# Patient Record
Sex: FEMALE | Race: WHITE
[De-identification: names, ages, dates, MRNs, and addresses within clinical notes are randomized per-mention and may not be internally consistent; named-entity substitution may affect disease eponyms.]

---

## 2017-09-25 LAB
ERYTHROCYTE [DISTWIDTH] IN BLOOD BY AUTOMATED COUNT: 13.2 % (ref 11.5–14.5)
HCT VFR BLD CALC: 43.7 % (ref 36–48)
HGB BLD-MCNC: 14.7 G/DL (ref 12–16)
MCH RBC QN AUTO: 30.1 PG (ref 26–34)
MCHC RBC AUTO-ENTMCNC: 33.6 G/DL (ref 31–37)
MCV RBC: 89.4 FL (ref 80–100)
PLATELET # BLD: 189 10X3/UL (ref 130–400)
PMV BLD AUTO: 10 FL (ref 7.4–10.4)
RBC # BLD AUTO: 4.89 10X6/UL (ref 4–5.4)
WBC # BLD AUTO: 6.7 10X3/UL (ref 4.8–10.8)

## 2017-09-27 ENCOUNTER — HOSPITAL ENCOUNTER (OUTPATIENT)
Dept: HOSPITAL 84 - D.OPS | Age: 52
Discharge: HOME | End: 2017-09-27
Attending: SURGERY
Payer: COMMERCIAL

## 2017-09-27 VITALS — BODY MASS INDEX: 33.66 KG/M2 | WEIGHT: 190 LBS | HEIGHT: 63 IN

## 2017-09-27 VITALS — DIASTOLIC BLOOD PRESSURE: 64 MMHG | SYSTOLIC BLOOD PRESSURE: 128 MMHG

## 2017-09-27 DIAGNOSIS — K63.5: Primary | ICD-10-CM

## 2017-09-27 DIAGNOSIS — E03.9: ICD-10-CM

## 2017-09-27 DIAGNOSIS — Z01.812: ICD-10-CM

## 2017-09-27 DIAGNOSIS — K21.9: ICD-10-CM

## 2017-09-27 DIAGNOSIS — E66.9: ICD-10-CM

## 2017-09-27 LAB — HCG UR QL: NEGATIVE

## 2017-09-27 NOTE — NUR
1330--IV DC'D, PT UP TO DRESS AT THIS TIME. JACEK TALBERT
 
1400--DISCHARGE INSTRUCTIONS GIVEN, PT VERBALIZES UNDERSTANDING. PT
OFF UNIT VIA WC. JACEK TALBERT

## 2017-09-29 NOTE — HP
PATIENT: SHILA YUSUF                                    MEDICAL RECORD: [id number]
ACCOUNT: [id number]                                    LOCATION:D.OPS         
: [birth year]                                            ADMISSION DATE: 17
                                                         
 
                             HISTORY AND PHYSICAL EXAMINATION
 
 
Addendum
 
PREOPERATIVE DIAGNOSIS:  Recurrent serrated adenoma of the cecum which has been
tattooed.
 
The patient's history and physical examination is on the chart.  It is typed
history and physical.  It is unchanged since the patient saw me in the office. 
The risks, possible complications and alternatives to procedure were explained
to the patient.  She elects to proceed.  This is a patient of Dr. Sanchez's.
 
TRANSINT:[id number] Voice Confirmation ID: [id number] DOCUMENT ID: [id number]
 
 
                                           
                                           REINA LOPEZ MD            
 
 
 
Electronically Signed by REINA LOPEZ on 17 at 1113
 
 
 
 
 
 
 
 
 
 
 
 
 
 
 
 
 
 
 
 
 
 
 
CC: ALISSA JASON DO, [place], TERRI MD and STEFFEN SANCHEZ [id number]
DICTATION DATE: 17 1225     :     17 1319      [facility] 
                                                                      17
[address] [address]

## 2017-09-29 NOTE — OP
PATIENT NAME:  SHILA YUSUF                              MEDICAL RECORD: [id number]
:[birth year]                                             LOCATION:D.OPS          
                                                         ADMISSION DATE:        
SURGEON:  EARL LOPEZ MD            
 
 
DATE OF OPERATION:  2017
 
PREOPERATIVE DIAGNOSIS:  History of recurrent serrated adenoma of the cecum,
which has been tattooed.
 
POSTOPERATIVE DIAGNOSES:
1.  History of recurrent serrated adenoma of the cecum, which has been tattooed
with no evidence of recurrence; however, there was eschar present within cecum.
2.  A new 1.1-cm sessile polyp at 1.1 meters.
 
PROCEDURES:
1.  Total colonoscopy to cecum.
2.  Hot biopsy forceps polypectomy times 1.
3.  Biopsy of scar in the cecum with treatment utilizing the argon plasma
coagulator.
 
SURGEON:  Earl Lopez MD
 
ASSISTANT:  None.
 
BLOOD LOSS:  Minimal.
 
ANESTHESIA:  General.
 
COMPLICATIONS:  None.
 
The risks, possible complications, and alternatives to the procedure were
explained to the patient.  She elects to proceed.
 
OPERATIVE COURSE:  The patient was conveyed to the operating room electively on
2017.  General anesthesia was induced by the anesthesia staff.  The
patient was placed in the Gibson position.  A digital rectal examination was
performed.  A colonoscope was inserted through the anus.  It was easily advanced
to the cecum.  Upon withdrawal, I dragged the folds.  I irrigated and aspirated
extensively.  I noted no tattoo in the cecum.  I noted no recurrent polyp. 
There was a scar, which I biopsied utilizing the cold endoscopic biopsy forceps.
 I then treated this area with the argon plasma coagulator utilizing the right
colon setting in the forced mode.
 
I continued to withdraw the endoscope.  I identified a sessile polyp at 1.1 m. 
This was removed in its entirety utilizing the hot biopsy forceps polypectomy
technique.  I then slowly withdrew the endoscope.  I dragged the folds.  The
pullback was greater than 19-minute pullback.  A retroflexed view was obtained
in the rectum.  I then unretroflexed the scope and removed it under direct
vision.
 
I will plan to see the patient in my office in 2-3 weeks.  I will plan for her
next colonoscopy with the argon plasma coagulator to take place in 1 year as she
has had a recurrent serrated adenoma.
 
TRANSINT:[id number] Voice Confirmation ID: [id number] DOCUMENT ID: [id number]
 
 
 
OPERATIVE REPORT                               [id number]    SHILA YUSUF ROBERT MD            
 
 
 
Electronically Signed by EARL LOPEZ on 17 at 1113
 
 
 
 
 
 
 
 
 
 
 
 
 
 
 
 
 
 
 
 
 
 
 
 
 
 
 
 
 
 
 
 
 
 
 
 
 
 
 
 
 
CC:                                                             [id number]
DICTATION DATE: 17 1240     :     17 1619      [facility] 
                                                                      17
[id number] [place] [address]

## 2018-10-15 LAB
ERYTHROCYTE [DISTWIDTH] IN BLOOD BY AUTOMATED COUNT: 13.5 % (ref 11.5–14.5)
HCT VFR BLD CALC: 42.3 % (ref 36–48)
HGB BLD-MCNC: 14.5 G/DL (ref 12–16)
MCH RBC QN AUTO: 29.1 PG (ref 26–34)
MCHC RBC AUTO-ENTMCNC: 34.3 G/DL (ref 31–37)
MCV RBC: 84.8 FL (ref 80–100)
PLATELET # BLD: 189 10X3/UL (ref 130–400)
PMV BLD AUTO: 10 FL (ref 7.4–10.4)
RBC # BLD AUTO: 4.99 10X6/UL (ref 4–5.4)
WBC # BLD AUTO: 6.1 10X3/UL (ref 4.8–10.8)

## 2018-10-17 ENCOUNTER — HOSPITAL ENCOUNTER (OUTPATIENT)
Dept: HOSPITAL 84 - D.OPS | Age: 53
Discharge: HOME | End: 2018-10-17
Attending: SURGERY
Payer: COMMERCIAL

## 2018-10-17 VITALS — BODY MASS INDEX: 36.32 KG/M2 | WEIGHT: 205 LBS | HEIGHT: 63 IN

## 2018-10-17 VITALS — DIASTOLIC BLOOD PRESSURE: 68 MMHG | SYSTOLIC BLOOD PRESSURE: 122 MMHG

## 2018-10-17 DIAGNOSIS — K63.5: Primary | ICD-10-CM

## 2018-10-17 DIAGNOSIS — Z87.19: ICD-10-CM

## 2018-12-18 LAB
ANION GAP SERPL CALC-SCNC: 15.2 MMOL/L (ref 8–16)
BUN SERPL-MCNC: 17 MG/DL (ref 7–18)
CALCIUM SERPL-MCNC: 9.4 MG/DL (ref 8.5–10.1)
CHLORIDE SERPL-SCNC: 103 MMOL/L (ref 98–107)
CO2 SERPL-SCNC: 26.2 MMOL/L (ref 21–32)
CREAT SERPL-MCNC: 0.8 MG/DL (ref 0.6–1.3)
ERYTHROCYTE [DISTWIDTH] IN BLOOD BY AUTOMATED COUNT: 14.3 % (ref 11.5–14.5)
GLUCOSE SERPL-MCNC: 90 MG/DL (ref 74–106)
HCT VFR BLD CALC: 39 % (ref 36–48)
HGB BLD-MCNC: 13 G/DL (ref 12–16)
MCH RBC QN AUTO: 28.7 PG (ref 26–34)
MCHC RBC AUTO-ENTMCNC: 33.3 G/DL (ref 31–37)
MCV RBC: 86.1 FL (ref 80–100)
OSMOLALITY SERPL CALC.SUM OF ELEC: 280 MOSM/KG (ref 275–300)
PLATELET # BLD: 185 10X3/UL (ref 130–400)
PMV BLD AUTO: 10.1 FL (ref 7.4–10.4)
POTASSIUM SERPL-SCNC: 4.4 MMOL/L (ref 3.5–5.1)
RBC # BLD AUTO: 4.53 10X6/UL (ref 4–5.4)
SODIUM SERPL-SCNC: 140 MMOL/L (ref 136–145)
WBC # BLD AUTO: 6.3 10X3/UL (ref 4.8–10.8)

## 2018-12-19 ENCOUNTER — HOSPITAL ENCOUNTER (INPATIENT)
Dept: HOSPITAL 84 - D.MS | Age: 53
LOS: 2 days | Discharge: HOME | DRG: 330 | End: 2018-12-21
Attending: SURGERY | Admitting: SURGERY
Payer: COMMERCIAL

## 2018-12-19 VITALS — DIASTOLIC BLOOD PRESSURE: 62 MMHG | SYSTOLIC BLOOD PRESSURE: 135 MMHG

## 2018-12-19 VITALS
BODY MASS INDEX: 37.46 KG/M2 | BODY MASS INDEX: 37.46 KG/M2 | HEIGHT: 63 IN | WEIGHT: 211.44 LBS | BODY MASS INDEX: 37.46 KG/M2 | HEIGHT: 63 IN | WEIGHT: 211.44 LBS

## 2018-12-19 VITALS — DIASTOLIC BLOOD PRESSURE: 50 MMHG | SYSTOLIC BLOOD PRESSURE: 146 MMHG

## 2018-12-19 VITALS — SYSTOLIC BLOOD PRESSURE: 137 MMHG | DIASTOLIC BLOOD PRESSURE: 66 MMHG

## 2018-12-19 VITALS — SYSTOLIC BLOOD PRESSURE: 120 MMHG | DIASTOLIC BLOOD PRESSURE: 63 MMHG

## 2018-12-19 VITALS — SYSTOLIC BLOOD PRESSURE: 94 MMHG | DIASTOLIC BLOOD PRESSURE: 42 MMHG

## 2018-12-19 VITALS — SYSTOLIC BLOOD PRESSURE: 137 MMHG | DIASTOLIC BLOOD PRESSURE: 55 MMHG

## 2018-12-19 VITALS — SYSTOLIC BLOOD PRESSURE: 145 MMHG | DIASTOLIC BLOOD PRESSURE: 61 MMHG

## 2018-12-19 VITALS — SYSTOLIC BLOOD PRESSURE: 142 MMHG | DIASTOLIC BLOOD PRESSURE: 59 MMHG

## 2018-12-19 VITALS — DIASTOLIC BLOOD PRESSURE: 55 MMHG | SYSTOLIC BLOOD PRESSURE: 129 MMHG

## 2018-12-19 VITALS — DIASTOLIC BLOOD PRESSURE: 63 MMHG | SYSTOLIC BLOOD PRESSURE: 120 MMHG

## 2018-12-19 DIAGNOSIS — K63.5: Primary | ICD-10-CM

## 2018-12-19 DIAGNOSIS — Q43.3: ICD-10-CM

## 2018-12-19 LAB — HCG UR QL: NEGATIVE

## 2018-12-19 PROCEDURE — 0DTJ4ZZ RESECTION OF APPENDIX, PERCUTANEOUS ENDOSCOPIC APPROACH: ICD-10-PCS | Performed by: SURGERY

## 2018-12-19 PROCEDURE — 0DTH4ZZ RESECTION OF CECUM, PERCUTANEOUS ENDOSCOPIC APPROACH: ICD-10-PCS | Performed by: SURGERY

## 2018-12-19 PROCEDURE — 0DSE4ZZ REPOSITION LARGE INTESTINE, PERCUTANEOUS ENDOSCOPIC APPROACH: ICD-10-PCS | Performed by: SURGERY

## 2018-12-20 VITALS — SYSTOLIC BLOOD PRESSURE: 114 MMHG | DIASTOLIC BLOOD PRESSURE: 46 MMHG

## 2018-12-20 VITALS — SYSTOLIC BLOOD PRESSURE: 96 MMHG | DIASTOLIC BLOOD PRESSURE: 45 MMHG

## 2018-12-20 VITALS — DIASTOLIC BLOOD PRESSURE: 56 MMHG | SYSTOLIC BLOOD PRESSURE: 126 MMHG

## 2018-12-20 VITALS — SYSTOLIC BLOOD PRESSURE: 102 MMHG | DIASTOLIC BLOOD PRESSURE: 54 MMHG

## 2018-12-20 VITALS — SYSTOLIC BLOOD PRESSURE: 123 MMHG | DIASTOLIC BLOOD PRESSURE: 63 MMHG

## 2018-12-20 VITALS — DIASTOLIC BLOOD PRESSURE: 62 MMHG | SYSTOLIC BLOOD PRESSURE: 106 MMHG

## 2018-12-20 LAB
ALBUMIN SERPL-MCNC: 2.7 G/DL (ref 3.4–5)
ALP SERPL-CCNC: 77 U/L (ref 46–116)
ALT SERPL-CCNC: 53 U/L (ref 10–68)
ANION GAP SERPL CALC-SCNC: 13.3 MMOL/L (ref 8–16)
BASOPHILS NFR BLD AUTO: 0.1 % (ref 0–2)
BILIRUB SERPL-MCNC: 0.33 MG/DL (ref 0.2–1.3)
BUN SERPL-MCNC: 11 MG/DL (ref 7–18)
CALCIUM SERPL-MCNC: 8.4 MG/DL (ref 8.5–10.1)
CHLORIDE SERPL-SCNC: 106 MMOL/L (ref 98–107)
CO2 SERPL-SCNC: 23.7 MMOL/L (ref 21–32)
CREAT SERPL-MCNC: 0.8 MG/DL (ref 0.6–1.3)
EOSINOPHIL NFR BLD: 0.1 % (ref 0–7)
ERYTHROCYTE [DISTWIDTH] IN BLOOD BY AUTOMATED COUNT: 14.9 % (ref 11.5–14.5)
GLOBULIN SER-MCNC: 3.6 G/L
GLUCOSE SERPL-MCNC: 103 MG/DL (ref 74–106)
HCT VFR BLD CALC: 35.1 % (ref 36–48)
HGB BLD-MCNC: 11.5 G/DL (ref 12–16)
IMM GRANULOCYTES NFR BLD: 0.3 % (ref 0–5)
LYMPHOCYTES NFR BLD AUTO: 29.1 % (ref 15–50)
MAGNESIUM SERPL-MCNC: 1.8 MG/DL (ref 1.8–2.4)
MCH RBC QN AUTO: 28.4 PG (ref 26–34)
MCHC RBC AUTO-ENTMCNC: 32.8 G/DL (ref 31–37)
MCV RBC: 86.7 FL (ref 80–100)
MONOCYTES NFR BLD: 8.3 % (ref 2–11)
NEUTROPHILS NFR BLD AUTO: 62.1 % (ref 40–80)
OSMOLALITY SERPL CALC.SUM OF ELEC: 276 MOSM/KG (ref 275–300)
PHOSPHATE SERPL-MCNC: 3.2 MG/DL (ref 2.5–4.9)
PLATELET # BLD: 200 10X3/UL (ref 130–400)
PMV BLD AUTO: 10.2 FL (ref 7.4–10.4)
POTASSIUM SERPL-SCNC: 4 MMOL/L (ref 3.5–5.1)
PROT SERPL-MCNC: 6.3 G/DL (ref 6.4–8.2)
RBC # BLD AUTO: 4.05 10X6/UL (ref 4–5.4)
SODIUM SERPL-SCNC: 139 MMOL/L (ref 136–145)
WBC # BLD AUTO: 7.4 10X3/UL (ref 4.8–10.8)

## 2018-12-21 VITALS — DIASTOLIC BLOOD PRESSURE: 52 MMHG | SYSTOLIC BLOOD PRESSURE: 111 MMHG

## 2018-12-21 VITALS — DIASTOLIC BLOOD PRESSURE: 60 MMHG | SYSTOLIC BLOOD PRESSURE: 118 MMHG

## 2018-12-21 VITALS — SYSTOLIC BLOOD PRESSURE: 122 MMHG | DIASTOLIC BLOOD PRESSURE: 60 MMHG

## 2018-12-21 LAB
ALBUMIN SERPL-MCNC: 2.8 G/DL (ref 3.4–5)
ALP SERPL-CCNC: 75 U/L (ref 46–116)
ALT SERPL-CCNC: 45 U/L (ref 10–68)
ANION GAP SERPL CALC-SCNC: 14.8 MMOL/L (ref 8–16)
BASOPHILS NFR BLD AUTO: 0.2 % (ref 0–2)
BILIRUB SERPL-MCNC: 0.15 MG/DL (ref 0.2–1.3)
BUN SERPL-MCNC: 9 MG/DL (ref 7–18)
CALCIUM SERPL-MCNC: 8.4 MG/DL (ref 8.5–10.1)
CHLORIDE SERPL-SCNC: 107 MMOL/L (ref 98–107)
CO2 SERPL-SCNC: 21.5 MMOL/L (ref 21–32)
CREAT SERPL-MCNC: 0.7 MG/DL (ref 0.6–1.3)
EOSINOPHIL NFR BLD: 0.8 % (ref 0–7)
ERYTHROCYTE [DISTWIDTH] IN BLOOD BY AUTOMATED COUNT: 15.2 % (ref 11.5–14.5)
GLOBULIN SER-MCNC: 3.3 G/L
GLUCOSE SERPL-MCNC: 99 MG/DL (ref 74–106)
HCT VFR BLD CALC: 35.9 % (ref 36–48)
HGB BLD-MCNC: 11.5 G/DL (ref 12–16)
IMM GRANULOCYTES NFR BLD: 0.2 % (ref 0–5)
LYMPHOCYTES NFR BLD AUTO: 35.5 % (ref 15–50)
MCH RBC QN AUTO: 28.7 PG (ref 26–34)
MCHC RBC AUTO-ENTMCNC: 32 G/DL (ref 31–37)
MCV RBC: 89.5 FL (ref 80–100)
MONOCYTES NFR BLD: 7.5 % (ref 2–11)
NEUTROPHILS NFR BLD AUTO: 55.8 % (ref 40–80)
OSMOLALITY SERPL CALC.SUM OF ELEC: 276 MOSM/KG (ref 275–300)
PLATELET # BLD: 145 10X3/UL (ref 130–400)
PMV BLD AUTO: 10.5 FL (ref 7.4–10.4)
POTASSIUM SERPL-SCNC: 4.3 MMOL/L (ref 3.5–5.1)
PROT SERPL-MCNC: 6.1 G/DL (ref 6.4–8.2)
RBC # BLD AUTO: 4.01 10X6/UL (ref 4–5.4)
SODIUM SERPL-SCNC: 139 MMOL/L (ref 136–145)
WBC # BLD AUTO: 6.4 10X3/UL (ref 4.8–10.8)